# Patient Record
Sex: MALE | Race: WHITE | NOT HISPANIC OR LATINO | Employment: FULL TIME | URBAN - METROPOLITAN AREA
[De-identification: names, ages, dates, MRNs, and addresses within clinical notes are randomized per-mention and may not be internally consistent; named-entity substitution may affect disease eponyms.]

---

## 2024-02-28 NOTE — PROGRESS NOTES
It was a pleasure to see Mr. Liao at the Neurosurgery Spine Clinic at [facility].     He is a really nice 59 y.o. male  who presents to us with complaints NECK pain RADIATING into the LEFT arm  that started about  3  years  ago, and have been gradually worsening since that time.  The symptoms started after a fall    The pain is 10 /10. The pain is described as aching and sharp and occurs all day.  Symptoms are exacerbated by nothing in particular. Factors which relieve the pain include rest      Numbness and/or tingling - YES    Weakness : YES    Bladder/Bowel symptoms - NO    The patient has tried medications (eg: gabapentin, NSAIDS and narcotics ) : Yes  PT : No  Pain Management with ESIs/selective nerve blocks  - NO    he is a NON-SMOKER and NON-DIABETIC    History of Depression : NO    PRIOR SPINE SURGERY: NO    Denies use of Aspirin, Coumadin, or Plavix or any other anticoagulants     NARCOTICS for pain : NO    Part of this patient’s history is from personal review of the patient’s previous charts.      No past medical history on file.      No current outpatient medications on file.      Review of Systems :   Constitutional: No fever or chills  Respiratory: No shortness of breath or wheezing  Musculoskeletal: see HPI above   Neurologic: See HPI above        EXAM:   There were no vitals filed for this visit.  NEURO: Neurologically patient is awake alert and oriented X 3    No obvious cranial nerve deficit.  Strength is almost 5/5 throughout all motor groups tested with no asymmetric significant motor deficit except presence of weakness involving left upper extremity.  Deep tendon reflexes are symmetric throughout but he clearly has evidence of hyperreflexia  Gait : WNL   Sensory examination is intact to touch and painful stimuli.        IMAGING:   No MRI imaging will available as they were performed at an outside hospital.  Staff showed of an MRI that he had on his telephone showed evidence of  spinal cord compression.    ASSESSMENT AND PLAN:  Douglas Liao is a 59 y.o. male who presents to us with signs and symptoms of cervical myelopathy.  I reviewed 1 snapshot of his MRI that likely suggest that he would need surgical treatment.  I have ordered upright x-rays cervical spine and flexion-extension views at this point.  I will see him back in clinic in a week from now following completion of his imaging and obtaining the imaging from the other hospital to discuss surgical option at that point.    It was a pleasure to participate in Mr. Liao clinical care. All questions were answered to him satisfaction and he explained understanding of the further treatment plan.     Dale Harden MD, [facility]   of Neurological Surgery  [facility]  Attending Surgeon  Director - Minimally Invasive Spine Surgery  [place], [place]      ---Some of this note was completed using Dragon voice recognition technology and sometimes the software misinterprets words. This may include unintended errors with respect to translation of words, typographical errors or grammar errors which may not have been identified prior to finalization of the chart note. Please take this into account when reading this note---          Dale Harden MD, [facility]   of Neurological Surgery  [facility]  Attending Surgeon  Director - Minimally Invasive Spine Surgery  [place], [place]      Some of this note was completed using Dragon voice recognition technology and sometimes the software misinterprets words. This may include unintended errors with respect to translation of words, typographical errors or grammar errors which may not have been identified prior to finalization of the chart note. Please take this into account when reading this note

## 2024-02-29 ENCOUNTER — OFFICE VISIT (OUTPATIENT)
Dept: NEUROSURGERY | Facility: CLINIC | Age: 59
End: 2024-02-29
Payer: COMMERCIAL

## 2024-02-29 ENCOUNTER — HOSPITAL ENCOUNTER (OUTPATIENT)
Dept: RADIOLOGY | Facility: HOSPITAL | Age: 59
Discharge: HOME | End: 2024-02-29
Payer: COMMERCIAL

## 2024-02-29 VITALS
HEIGHT: 71 IN | RESPIRATION RATE: 20 BRPM | SYSTOLIC BLOOD PRESSURE: 154 MMHG | BODY MASS INDEX: 28 KG/M2 | WEIGHT: 200 LBS | HEART RATE: 86 BPM | DIASTOLIC BLOOD PRESSURE: 81 MMHG

## 2024-02-29 DIAGNOSIS — G95.9 CERVICAL MYELOPATHY (MULTI): Primary | ICD-10-CM

## 2024-02-29 PROCEDURE — 99203 OFFICE O/P NEW LOW 30 MIN: CPT | Performed by: NEUROLOGICAL SURGERY

## 2024-02-29 PROCEDURE — 72050 X-RAY EXAM NECK SPINE 4/5VWS: CPT | Performed by: STUDENT IN AN ORGANIZED HEALTH CARE EDUCATION/TRAINING PROGRAM

## 2024-02-29 PROCEDURE — 72050 X-RAY EXAM NECK SPINE 4/5VWS: CPT

## 2024-02-29 PROCEDURE — 99213 OFFICE O/P EST LOW 20 MIN: CPT | Performed by: NEUROLOGICAL SURGERY

## 2024-02-29 PROCEDURE — 1036F TOBACCO NON-USER: CPT | Performed by: NEUROLOGICAL SURGERY

## 2024-02-29 RX ORDER — BACLOFEN 10 MG/1
10 TABLET ORAL NIGHTLY PRN
COMMUNITY
Start: 2024-02-01

## 2024-02-29 ASSESSMENT — PAIN SCALES - GENERAL: PAINLEVEL: 6

## 2024-03-06 NOTE — PROGRESS NOTES
It was a pleasure to see Mr. Yanni fairchild at the Neurosurgery Spine Clinic at [facility]. He is a really nice 59 y.o. male who presents to us with complaints NECK pain RADIATING into the LEFT arm that started about 3 years ago, and have been gradually worsening since that time.  The symptoms started after a fall. The pain is 10 /10. The pain is described as aching and sharp and occurs all day.  Symptoms are exacerbated by nothing in particular. Factors which relieve the pain include rest. Hard to put arm over his head.  He has been having the symptoms for almost 2 years or so.  He mentioned that he developed a significant weakness involving the left upper extremity when it all started but then regained improvement in his strength.  At this point of time he continues to have significant numbness and tingling involving the left upper extremity especially the hands which is his predominant symptom.  He also has weakness involving the left upper extremity and has difficulty doing activities of daily living that involves fine dexterity.  Overall he denies any significant worsening in the right upper extremity or lower extremities and denies any significant gait imbalance.  He does does not have any bowel bladder symptoms either.  He does have significant neck pain especially involving the left paraspinal region that radiates into the upper extremities.   He also has stiffness involving the bilateral lower extremities.  At this point he is concerned mainly with the weakness and numbness and tingling especially in the left upper extremity.    Part of this patient’s history is from personal review of the patient’s previous charts.      Past Medical History:   Diagnosis Date    Neck pain            Current Outpatient Medications:     baclofen (Lioresal) 10 mg tablet, Take 1 tablet (10 mg) by mouth as needed at bedtime., Disp: , Rfl:       Review of Systems :   Constitutional: No fever or chills  Respiratory: No  shortness of breath or wheezing  Musculoskeletal: see HPI above   Neurologic: See HPI above        EXAM:   There were no vitals filed for this visit.    GENERAL: no apparent distress  EYES: No icterus;  extraocular movements intact   ENT: No mucosal ulcerations; normal hard and soft palate   NECK: trachea midline; no thyromegaly or lymphadenopathy   RESPIRATORY: normal respiratory effort; no audible wheezes/rhonchi  EXTREMITIES: normal pulses, no edema  SKIN: Normal temperature; no rash, ulcers or subcutaneous nodules     MUSCULOSKELETAL:   Range of motion of the cervical spine is significantly decreased especially in extension.     Paraspinal muscle spasm/tenderness absent.   Lhermitte Sign: Absent  NO scars in the neck.  NO obvious muscle atrophy ( Arm / Forearm /  thenar / hypothenar, or hand intrinsics)   NO evidence of any obvious sagittal and/or coronal plane malalignment    Romberg's test: Negative  Spurling's :positive  Gait: No abnormality    NEUROLOGIC: Patient is awake, alert and oriented to name, place and time.  No obvious cranial nerve deficit.    MOTOR 5/5 strength in right upper extremity and bilateral lower extremity but he has clear evidence of weakness involving left upper extremity involving multiple muscle groups.  Left shoulder abduction is about 4+ by 5 left biceps is about 4 x 5 left triceps about 4 - by 5 with handgrip about 40 to 50% and hand intrinsics strength being about 4 - by 5.       Bulk: Within normal limits   Tone: Increased bilateral lower extremities  Durham sign positive  Reflexes are symmetric throughout with very obvious hyperreflexia involving bilateral lower extremities..   Sensory exam shows no gross dermatomal sensory deficits to light touch and pain    IMAGING:   AP and lateral x-rays of the cervical spine along with flexion and extension views done on 2/29/2024 were reviewed personally and shows presence of degenerative changes involving the cervical spine that in the  form of disc collapse especially at C4-5 C5-6 and C6-7 level with presence of large anterior osteophytes at C4-5.  Patient has about 5 to 6 degrees of cervical kyphosis on upright lateral x-rays with no evidence of any obvious instability.  The Between flexion and extension range of motion is about 16 degrees.  MRI of the cervical spine dated 8/12/2023 and available to review on the PACS from the outside hospital shows presence of multilevel degenerative changes resulting in mild cervical stenosis at C3-4 and a severe cervical stenosis at C4-5 C5-6 and C6-7 along with disc degeneration and collapse resulting in severe spinal cord compression along with bilateral neuroforaminal compromise from C4-5 down to C6-7.  CT scan of the cervical spine done on 7/12/2023 was also reviewed and shows presence of C4-5 C5-6 C6-7 disc degeneration and collapse with significant vacuum phenomenon involving the C5 vertebral body.  There is presence of significant left C3-4 facet hypertrophy with almost autofusion of left C3-4 facet joint.    ASSESSMENT AND PLAN:  Mr. Liao has symptoms such as neck pain with upper extremity pain ,loss of manual dexterity, Numbness in hands and/or feet along with physical examination findings with presence of weakness involving left upper extremity lower limb spasticity, consistent with a diagnosis of cervical myelopathy and cervical radiculopathy  MRI shows significant spinal cord compression and severe stenosis.  The severity of cervical myelopathy is Moderate (12-14) based on mJOA scale     I discussed the natural history of degenerative cervical stenosis with the fact that the patient is at risk of gradual worsening of symptoms from persistent spinal cord compression and the potential for them to be irreversible. Considering the presence of signs and symptoms of cervical MYELOPATHY from SPINAL CORD COMPRESSION, medical necessity of surgical treatment to prevent further neurological decline was  also discussed.     I clearly emphasized that while the goal of surgery would be to decompress the spinal cord so as to ARREST the progression of neurological deficits. While preexisting deficits may or may not improve after surgery; up to 70-80% of patients does shows clinically significant improvement in functional and neurological outcomes following decompression of the spinal cord in patients with cervical degenerative myelopathy the extent of which could be variable and generally depends on the severity of myelopathy, duration of deficits and age of the patient.The option of continued non-operative management was also discussed as well though not strongly recommended because of presence of signs of cervical myelopathy and presence of spinal cord compression.     Pros and cons of operative versus nonoperative treatment were discussed extensively.  I extensively discussed with him the various surgical options that would consist of a focal C4-6 laminoplasty for decompression of the spinal cord versus posterior cervical decompression and fusion from C2-T1 versus anterior posterior surgery with multilevel decompression from the front followed by posterior stabilization versus an anterior alone surgery to address the most symptomatic stenotic levels from C4-C7.  He has seen Dr. Manolo Garner at [facility] who recommended a posterior cervical surgery along with a C2-T2 instrumented and fusion.  I did discuss with him the various goals that would be accomplished from either of those approaches and I went over with him the various complication profile associated with of each approaches.  Also discussed with him the fact that his C5 vertebral body has significant vacuum phenomena affecting almost 1/3 of the vertebral body and performing an ACDF at that level would be difficult to due to subsidence of the bone graft or cage that would be placed.  Hence an anterior approach would be would be a hybrid construct with a  corpectomy and an discectomy.    I recommended a anterior cervical approach with C5 corpectomy with placement of corpectomy cage and a C6-7 anterior cervical discectomy with placement of a titanium interbody cage and fusion using local bone autograft and DBM with a C4-7 anterior cervical plating.      I have explained the surgical procedure for an ANTERIOR CERVICAL DISCECTOMY AND FUSION (ACDF) with PLATING  in detail with expected duration and extent of recovery along risks of surgery that include, but is not limited to postoperative hematoma, neck pain, LOSS OF MOTION, dysphagia, recurrent laryngeal nerve palsy, voice changes/hoarseness of voice, pharyngeal or esophageal laceration, bleeding, infection, blood vessel injury or damage, nerve/spinal cord  injury or damage resulting in loss of sensation, loss of bladder, bowel or sexual function and weakness/complete paralysis, malunion, nonunion, CSF leak , brachial plexus injury, failure of implants/fusion, failure to relieve symptoms, recurrent disease, adjacent segment disease, need to reoperate for any reason and general anesthesia reaction such as stroke, coma, heart attack, delirium, confusion, death as well as worsening of preexisted medical conditions and any other unforeseen complication related to or unrelated to the spinal procedure per se. I have also discussed with the patient the chances of C5 palsy  ( about 2-3 % ) and the expected course and the chances of recovery if that happens. Of those 2-3 % of patietns majority of patients with C5 palsy would recover within 3-6 months but 5  -10 percent of all patiens who would develop C5 palsy may not recover and may have a permament C5 palsy with resultant limitations.  I did discuss with him that he does have some focal kyphosis at the C3-4 level but given the absence of any obvious stenosis at that point I would not recommend that involving at that level into the fusion but did caution him that he may  eventually need surgical treatment for that over time if that were to become symptomatic.  A Shared decision was made to proceed with surgery after  involving the patient in the treatment decision-making process. He clearly expressed understanding of possible the risks and benefits of surgery and the realistic expectations of surgery along with the fact that the goal of the surgery is to improve the overall functioning and quality of life and possible improvement or prevent progression of preexisting neurological deficits and not necessarily 100 % pain relief.     The patient was educated and advised against activities predisposing to falls/MVC/severe trauma  that may result in spinal cord injury/deficits/paralysis and instructed to watch for worsening of already existing symptoms of cervical myelopathy while awaiting surgery that we would schedule soon depending on patients desires and OR availability and should let us know or go to ED if that were to happen.   The patient has some personal issues that he need to sort out and wants to do the surgery in August or so.  I believe to the time he does not demonstrate any neurological decline it would be all right especially given the fact that he has been having symptoms for almost a couple of years now.  It was a pleasure to participate in Mr. Liao clinical care. All questions were answered to him satisfaction and he explained understanding of the further treatment plan.     Dale Harden MD, [facility]   of Neurological Surgery  [facility] [employer]  Attending Surgeon  Director - Minimally Invasive Spine Surgery  [place], [place]      ---Some of this note was completed using Dragon voice recognition technology and sometimes the software misinterprets words. This may include unintended errors with respect to translation of words, typographical errors or grammar errors which may not  have been identified prior to finalization of the chart note. Please take this into account when reading this note---

## 2024-03-07 ENCOUNTER — OFFICE VISIT (OUTPATIENT)
Dept: NEUROSURGERY | Facility: CLINIC | Age: 59
End: 2024-03-07
Payer: COMMERCIAL

## 2024-03-07 VITALS
DIASTOLIC BLOOD PRESSURE: 90 MMHG | HEART RATE: 82 BPM | BODY MASS INDEX: 28 KG/M2 | WEIGHT: 200 LBS | HEIGHT: 71 IN | SYSTOLIC BLOOD PRESSURE: 173 MMHG | RESPIRATION RATE: 20 BRPM

## 2024-03-07 DIAGNOSIS — M47.10 SPINAL CORD COMPRESSION DUE TO DEGENERATIVE DISORDER OF SPINAL COLUMN: ICD-10-CM

## 2024-03-07 DIAGNOSIS — M40.12 OTHER SECONDARY KYPHOSIS, CERVICAL REGION: ICD-10-CM

## 2024-03-07 DIAGNOSIS — G95.9 CERVICAL MYELOPATHY (MULTI): Primary | ICD-10-CM

## 2024-03-07 DIAGNOSIS — M54.12 CERVICAL RADICULOPATHY AT C6: ICD-10-CM

## 2024-03-07 PROCEDURE — 99215 OFFICE O/P EST HI 40 MIN: CPT | Performed by: NEUROLOGICAL SURGERY

## 2024-03-07 PROCEDURE — 1036F TOBACCO NON-USER: CPT | Performed by: NEUROLOGICAL SURGERY

## 2024-03-07 ASSESSMENT — PAIN SCALES - GENERAL: PAINLEVEL: 4

## 2025-02-03 ENCOUNTER — TELEPHONE (OUTPATIENT)
Dept: NEUROSURGERY | Facility: HOSPITAL | Age: 60
End: 2025-02-03
Payer: COMMERCIAL

## 2025-02-09 NOTE — PROGRESS NOTES
It was a pleasure to see Mr. Liao back at the Neurosurgery Sp[facility] at [facility]. He is a really nice 59 y.o. male who I saw back in 2024 March for his signs and symptoms of cervical myelopathy.  At that point we discussed his clinical symptoms imaging findings and I recommended surgical intervention.  The patient wanted to wait before proceeding with any surgical intervention.  He is here today again to discuss his ongoing symptoms and further treatment options at this point.  He feels majority of his symptoms continues to remain the same with some worsening over the past few months or so.    He continues to have complaints of NECK pain RADIATING into the LEFT arm that started about 4 years ago, and have been gradually worsening since that time.  The symptoms started after a fall. The pain is 10 /10. The pain is described as aching and sharp and occurs all day.  Symptoms are exacerbated by nothing in particular. Factors which relieve the pain include rest. Hard to put arm over his head.  He also has weakness involving the upper extremity and has difficulty doing activities of daily living that involves fine dexterity.  He does does not have any bowel bladder symptoms either.  He does have significant neck pain especially involving the left paraspinal region that radiates into the upper extremities.   He also has stiffness involving the bilateral lower extremities.  At this point he is concerned mainly with the weakness and numbness and tingling especially in the left upper extremity.  He feels significant numbness involving the left pinky and ring finger   Part of this patient’s history is from personal review of the patient’s previous charts.        Part of this patient’s history is from personal review of the patient’s previous charts.      Past Medical History:   Diagnosis Date    Neck pain            Current Outpatient Medications:     baclofen (Lioresal) 10 mg tablet, Take 1 tablet (10 mg)  by mouth as needed at bedtime., Disp: , Rfl:          Review of Systems :   Constitutional: No fever or chills  Respiratory: No shortness of breath or wheezing  Musculoskeletal: see HPI above   Neurologic: See HPI above           EXAM:   There were no vitals filed for this visit.     GENERAL: no apparent distress  EYES: No icterus;  extraocular movements intact   ENT: No mucosal ulcerations; normal hard and soft palate       NEUROLOGIC: Patient is awake, alert and oriented to name, place and time.  No obvious cranial nerve deficit.    MOTOR 5/5 strength in right upper extremity and bilateral lower extremity but he has clear evidence of weakness involving left upper extremity involving multiple muscle groups.  Left shoulder abduction is about 4+ by 5 left biceps is about 4 x 5 left triceps about 4 - by 5 with handgrip about 40 to 50% and hand intrinsics strength being about 4 - by 5.                                      Bulk: Within normal limits   Tone: Increased bilateral lower extremities  Durham sign positive  Reflexes are symmetric throughout with very obvious hyperreflexia involving bilateral lower extremities..   Sensory exam shows presence of a significant numbness involving the left pinky and ring finger as compared to the right     IMAGING:   AP and lateral x-rays of the cervical spine along with flexion and extension views done on 2/29/2024 were reviewed personally and shows presence of degenerative changes involving the cervical spine that in the form of disc collapse especially at C4-5 C5-6 and C6-7 level with presence of large anterior osteophytes at C4-5.    There is also presence of C3-4 grade 1 spondylolisthesis especially on flexion patient has about 5 to 6 degrees of cervical kyphosis on upright lateral x-rays with no evidence of any obvious instability.     MRI of the cervical spine dated 8/12/2023 and available to review on the PACS from the outside hospital shows presence of multilevel  degenerative changes resulting in mild cervical stenosis at C3-4 and a severe cervical stenosis at C4-5 C5-6 and C6-7 along with disc degeneration and collapse resulting in severe spinal cord compression along with bilateral neuroforaminal compromise from C4-5 down to C6-7.  CT scan of the cervical spine done on 7/12/2023 was also reviewed and shows presence of C4-5 C5-6 C6-7 disc degeneration and collapse with significant vacuum phenomenon involving the C5 vertebral body.  There is presence of significant left C3-4 facet hypertrophy with almost autofusion of left C3-4 facet joint.           ASSESSMENT AND PLAN:  Mr. Liao has symptoms such as neck pain with upper extremity pain ,loss of manual dexterity, Numbness in hands and/or feet along with physical examination findings with presence of weakness involving left upper extremity lower limb spasticity, consistent with a diagnosis of cervical myelopathy and cervical radiculopathy.  He has noted mild worsening of his symptoms ever since he saw me last time.  We discussed options of surgery last time but seems like he had some personal issues and wanted to withhold surgery for which she seems to be ready now and is here today to discuss and schedule surgery if possible.  MRI shows significant spinal cord compression and severe stenosis.  The severity of cervical myelopathy is Moderate (12-14) based on mJOA scale     I discussed the natural history of degenerative cervical stenosis with the fact that the patient is at risk of gradual worsening of symptoms from persistent spinal cord compression and the potential for them to be irreversible. Considering the presence of signs and symptoms of cervical MYELOPATHY from SPINAL CORD COMPRESSION, medical necessity of surgical treatment to prevent further neurological decline was also discussed.      I clearly emphasized that while the goal of surgery would be to decompress the spinal cord so as to ARREST the progression of  neurological deficits. While preexisting deficits may or may not improve after surgery; up to 70-80% of patients does shows clinically significant improvement in functional and neurological outcomes following decompression of the spinal cord in patients with cervical degenerative myelopathy the extent of which could be variable and generally depends on the severity of myelopathy, duration of deficits and age of the patient.The option of continued non-operative management was also discussed as well though not strongly recommended because of presence of signs of cervical myelopathy and presence of spinal cord compression.      Pros and cons of operative versus nonoperative treatment were discussed extensively.  I extensively discussed with him the various surgical options that would consist of a focal C4-6 laminoplasty for decompression of the spinal cord versus posterior cervical decompression and fusion from C2-T1 versus anterior posterior surgery with multilevel decompression from the front followed by posterior stabilization versus an anterior alone surgery to address the most symptomatic stenotic levels from C4-C7.  He has seen Dr. Manolo Garner at [facility] who recommended a posterior cervical surgery along with a C2-T2 instrumented and fusion.  I did discuss with him the various goals that would be accomplished from either of those approaches and I went over with him the various complication profile associated with of each approaches.  Also discussed with him the fact that his C5 vertebral body has significant vacuum phenomena affecting almost 1/3 of the vertebral body and performing an ACDF at that level would be difficult to due to subsidence of the bone graft or cage that would be placed.  Hence any anterior approach would be would be a hybrid construct with a corpectomy and an discectomy.  Given the fact that he has stenosis at 4 levels he would remain at significantly high risk of pseudoarthrosis and  failure if not stabilize concomitantly posteriorly and hence at this point of time I would recommend an anterior posterior same-day approach.     I recommended a Same-day anterior posterior approach with C5 corpectomy with placement of corpectomy cage, C3-4 and C6-7 anterior cervical discectomy with placement of a stand-alone titanium interbody cage with integrated fixation, followed by C4-5 laminoforaminotomy and the C2-T1 instrumentation and fusion using  local bone autograft and signify which is an osteopromotive  material for spine surgery      The patient MRI of the cervical spine at this point of time he is almost 2 years old and hence I have ordered a new MRI to make sure that there are no new changes that would require any kind of modification of the surgical plan.     I have explained the surgical procedure in detail with expected duration and extent of recovery along risks of surgery that include, but is not limited to neck pain, postoperative hematoma, dysphagia with possible need of feeding tube or PEG placement, prolonged intubation with possible need of tracheostomy, recurrent laryngeal nerve palsy, voice changes/hoarseness of voice, pharyngeal or esophageal laceration, bleeding, infection, blood vessel injury or damage, nerve/spinal cord  injury or damage resulting in loss of sensation, loss of bladder, bowel or sexual function and weakness/complete paralysis, malunion, nonunion, CSF leak , blindness,  brachial plexus injury, failure of implants/fusion, failure to relieve symptoms, recurrent disease, adjacent segment disease, need to reoperate for any reason and general anesthesia reaction such as stroke, coma, heart attack, delirium, confusion, death as well as worsening of preexisted medical conditions and any other unforeseen complication related to unrelated to the spinal procedure per se. I have also discussed with the patient the chances of C5 palsy  ( about 5- 8 % ) and the expected course and  the chances of recovery if that happens. Of those 5-8 % of patietns majority of patients with C5 palsy would recover within 3-6 months but 5  -10 percent of all patiens who would develop C5 palsy may not recover and may have a permament C5 palsy with resultant limitations.    A Shared decision was made to proceed with surgery after  involving the patient in the treatment decision-making process. He clearly expressed understanding of possible the risks and benefits of surgery and the realistic expectations of surgery along with the fact that the goal of the surgery is to improve the overall functioning and quality of life and possible improvement or prevent progression of preexisting neurological deficits and not necessarily 100 % pain relief.      The patient was educated and advised against activities predisposing to falls/MVC/severe trauma  that may result in spinal cord injury/deficits/paralysis and instructed to watch for worsening of already existing symptoms of cervical myelopathy while awaiting surgery that we would schedule soon depending on patients desires and OR availability and should let us know or go to ED if that were to happen.     The patient has some personal issues that he need to sort out and wants to do the surgery in July or August this year or so.  I believe to the time he does not demonstrate any neurological decline it would be all right especially given the fact that he has been having symptoms for almost a couple of years now.  It was a pleasure to participate in Mr. Liao clinical care. All questions were answered to him satisfaction and he explained understanding of the further treatment plan.      Dale Harden MD, [facility]   of Neurological Surgery  [facility] [employer]  Attending Surgeon  Director - Minimally Invasive Spine Surgery  [place], [place]        Some of this note was completed using Dragon  voice recognition technology and sometimes the software misinterprets words. This may include unintended errors with respect to translation of words, typographical errors or grammar errors which may not have been identified prior to finalization of the chart note. Please take this into account when reading this note

## 2025-02-20 ENCOUNTER — OFFICE VISIT (OUTPATIENT)
Dept: NEUROSURGERY | Facility: CLINIC | Age: 60
End: 2025-02-20
Payer: COMMERCIAL

## 2025-02-20 VITALS
WEIGHT: 205 LBS | HEIGHT: 71 IN | HEART RATE: 76 BPM | RESPIRATION RATE: 18 BRPM | TEMPERATURE: 97.9 F | DIASTOLIC BLOOD PRESSURE: 84 MMHG | BODY MASS INDEX: 28.7 KG/M2 | SYSTOLIC BLOOD PRESSURE: 156 MMHG

## 2025-02-20 DIAGNOSIS — M47.10 SPINAL CORD COMPRESSION DUE TO DEGENERATIVE DISORDER OF SPINAL COLUMN: ICD-10-CM

## 2025-02-20 DIAGNOSIS — M40.202 KYPHOSIS OF CERVICAL REGION, UNSPECIFIED KYPHOSIS TYPE: Primary | ICD-10-CM

## 2025-02-20 DIAGNOSIS — M54.2 NECK PAIN: ICD-10-CM

## 2025-02-20 DIAGNOSIS — G95.9 CERVICAL MYELOPATHY: ICD-10-CM

## 2025-02-20 PROCEDURE — 3008F BODY MASS INDEX DOCD: CPT | Performed by: NEUROLOGICAL SURGERY

## 2025-02-20 PROCEDURE — 99215 OFFICE O/P EST HI 40 MIN: CPT | Performed by: NEUROLOGICAL SURGERY

## 2025-02-20 PROCEDURE — 1036F TOBACCO NON-USER: CPT | Performed by: NEUROLOGICAL SURGERY

## 2025-02-20 ASSESSMENT — ENCOUNTER SYMPTOMS
DEPRESSION: 1
OCCASIONAL FEELINGS OF UNSTEADINESS: 1
LOSS OF SENSATION IN FEET: 0

## 2025-02-20 ASSESSMENT — PAIN SCALES - GENERAL: PAINLEVEL_OUTOF10: 2

## 2025-02-24 PROBLEM — G95.9 CERVICAL MYELOPATHY: Status: ACTIVE | Noted: 2025-02-20

## 2025-02-24 PROBLEM — M47.10 SPINAL CORD COMPRESSION DUE TO DEGENERATIVE DISORDER OF SPINAL COLUMN: Status: ACTIVE | Noted: 2025-02-20

## 2025-02-24 PROBLEM — M40.202 KYPHOSIS OF CERVICAL REGION: Status: ACTIVE | Noted: 2025-02-20

## 2025-05-23 ENCOUNTER — ANCILLARY PROCEDURE (OUTPATIENT)
Dept: URGENT CARE | Age: 60
End: 2025-05-23
Payer: COMMERCIAL

## 2025-05-23 ENCOUNTER — OFFICE VISIT (OUTPATIENT)
Dept: URGENT CARE | Age: 60
End: 2025-05-23
Payer: COMMERCIAL

## 2025-05-23 VITALS
RESPIRATION RATE: 16 BRPM | TEMPERATURE: 98.1 F | SYSTOLIC BLOOD PRESSURE: 152 MMHG | OXYGEN SATURATION: 98 % | DIASTOLIC BLOOD PRESSURE: 89 MMHG | HEART RATE: 81 BPM

## 2025-05-23 DIAGNOSIS — R05.1 ACUTE COUGH: ICD-10-CM

## 2025-05-23 DIAGNOSIS — J98.8 RESPIRATORY TRACT INFECTION: Primary | ICD-10-CM

## 2025-05-23 PROCEDURE — 1036F TOBACCO NON-USER: CPT | Performed by: PHYSICIAN ASSISTANT

## 2025-05-23 PROCEDURE — 71046 X-RAY EXAM CHEST 2 VIEWS: CPT | Performed by: PHYSICIAN ASSISTANT

## 2025-05-23 PROCEDURE — 99203 OFFICE O/P NEW LOW 30 MIN: CPT | Performed by: PHYSICIAN ASSISTANT

## 2025-05-23 RX ORDER — AZITHROMYCIN 250 MG/1
TABLET, FILM COATED ORAL
Qty: 6 TABLET | Refills: 0 | Status: SHIPPED | OUTPATIENT
Start: 2025-05-23 | End: 2025-05-28

## 2025-05-23 NOTE — PROGRESS NOTES
Subjective   Patient ID: Douglas Liao is a 60 y.o. male. They present today with a chief complaint of Cough (R/O pneumonia).    CC: URI symptoms    HPI: Patient presenting for URI symptoms x 6 days.  Symptoms include runny nose, congestion, and cough.  No fever, chills, myalgias, abdominal pain, nausea, vomiting, diarrhea, rash.  No chest pain or shortness of breath.  No history of clots or clotting disorders.  No lower extremity swelling edema or pain.  No recent travel or surgeries.    Past Medical History  Allergies as of 05/23/2025    (No Known Allergies)       Prescriptions Prior to Admission[1]    Medical History[2]    Surgical History[3]     reports that he has quit smoking. His smoking use included cigarettes. He has never used smokeless tobacco. He reports current alcohol use. He reports that he does not use drugs.    Review of Systems  Review of Systems    After reviewing all body systems I have documented pertinent findings above in the history.  All other Systems reviewed and are negative for complaint.  Pertinent positive and negatives are listed in the above HPI.    Objective    Vitals:    05/23/25 1025   BP: 152/89   Pulse: 81   Resp: 16   Temp: 36.7 °C (98.1 °F)   SpO2: 98%     No LMP for male patient.  Physical Exam    General: Alert, oriented, and cooperative.  No acute distress. Well developed, well nourished.     Skin: Skin is warm, and dry. No rashes or lesions.    Eyes: Sclera and conjunctivae normal     Ears: TM´s are intact, pink/grey, with mild effusions bilaterally.  No significant erythema.  No hemotympanum or rupture. Bilateral auricle, helix, and tragus without inflammation or erythema.  No mastoid erythema, or tenderness.  No deformities. Right and left canal negative for erythema, or discharge.  Hearing is grossly intact bilaterally    Mouth/Throat: Pharynx is erythematous.  Tonsils are equal in size.  No exudates.  No angioedema of the lips or tongue.  No respiratory compromise,  tripoding, drooling, muffled voice,  stridor, or trismus.     Neck: Supple.     Cardiac: Regular rate and rhythm    Respiratory:  No acute respiratory distress.  Regular rate of breathing.  No accessory muscle use.  No tripoding.  Lungs are clear bilaterally.    Abdomen: Soft, nontender.    Musculoskeletal: Upper and lower extremities are atraumatic in appearance without deformity, erythema, edema, and atrophy. No crepitus, or tenderness. Compartments are all soft.    Procedures    Point of Care Test & Imaging Results from this visit  No results found for this or any previous visit.  Imaging  XR chest 2 views  Result Date: 5/23/2025  No acute cardiopulmonary process is evident.   MACRO: None   Signed by: Bucky Raphael 5/23/2025 10:54 AM Dictation workstation:   [id number]      Cardiology, Vascular, and Other Imaging  No other imaging results found for the past 2 days      Diagnostic study results (if any) were reviewed by Efrain Baires PA-C.    Assessment/Plan   Allergies, medications, history, and pertinent labs/EKGs/Imaging reviewed by Efrain Baires PA-C.     MDM:  Patient presenting for URI type symptoms x 6 days.  No relief despite taking over-the-counter medications.  Patient overall looks well.  Speaks in complete sentences.  No evidence of acute distress or respiratory compromise.  No tripoding. No nasal flaring. No clinical signs or history to suggest meningitis, Elvin´s, peritonsillar abscess, epiglottitis, or asthma.  Due length and worsening of  symptoms a bacterial respiratory tract infection is considered the most likely cause.  Through shared decision making the patient was prescribed an antimicrobial as a treatment measure.  Advised supportive therapy with over the counter medication and follow-up with a PCP in the next few days. Pt/family instructed to return to the urgent care or emergency department if symptoms worsen or if new symptoms develop. Patient/family expressed understanding  and consented to the above plan. No barriers of communication were apparent.    Declined in-house testing of the chest x-ray.      Orders and Diagnoses  Diagnoses and all orders for this visit:  Respiratory tract infection  -     azithromycin (Zithromax) 250 mg tablet; Take 2 tabs (500 mg) by mouth today, than 1 daily for 4 days.  Acute cough  -     XR chest 2 views; Future  -     azithromycin (Zithromax) 250 mg tablet; Take 2 tabs (500 mg) by mouth today, than 1 daily for 4 days.      Medical Admin Record    Patient disposition: Home    Electronically signed by Efrain Baires PA-C  10:58 AM         [1] (Not in a hospital admission)   [2]   Past Medical History:  Diagnosis Date    Neck pain    [3]   Past Surgical History:  Procedure Laterality Date    NO PAST SURGERIES

## 2025-05-23 NOTE — PATIENT INSTRUCTIONS
You were seen for cold like symptoms.  You most likely have a upper respiratory tract infection.  I recommend supportive therapy with the following medications below.    PLAN:  1.  Please take antibiotic as prescribed    2.  You can use Tea and honey for cough. This is known to work better than most OTC medications.    3.  Mucinex can break up congestion in adults.        Aller-Pia or over-the-counter children's cold medication such as Dimetapp can be beneficial for symptoms in kids.    4.  Stay well-hydrated and drink lots of fluids.    5.  Follow up with your primary care physician in the next few days for reevaluation, especially if symptoms are not improving.    6.  Return to the urgent care or emergency department if symptoms worsen or new symptoms develop.    Based on clinical exam findings and history you most likely have a upper respiratory tract infection.  Your initial illness was likely caused by a virus that progressed to a secondary bacterial infection.  You are contagious as soon as the symptoms start.  Your symptoms may persist up to 2-3 weeks.  Symptoms usually peak by days 3 or 5.  Typical symptoms include nasal congestion, a runny nose, scratchy throat, cough, and irritability. The diagnosis is based on symptoms. Good hand hygiene is the best way to prevent these infections, and routine vaccination can help prevent influenza. Treatment aims to relieve symptoms. Rest and fluids. Tylenol and/or ibuprofen for fever and pain. Over the counter decongestants or mucolytics.  All do not take medications that may be contraindicated by another medical condition.

## 2025-08-27 ENCOUNTER — APPOINTMENT (OUTPATIENT)
Dept: PRIMARY CARE | Facility: CLINIC | Age: 60
End: 2025-08-27
Payer: COMMERCIAL

## 2025-08-27 PROBLEM — M50.20 HNP (HERNIATED NUCLEUS PULPOSUS), CERVICAL: Status: ACTIVE | Noted: 2021-03-04

## 2025-09-16 ENCOUNTER — APPOINTMENT (OUTPATIENT)
Dept: PRIMARY CARE | Facility: CLINIC | Age: 60
End: 2025-09-16
Payer: COMMERCIAL

## 2025-10-01 ENCOUNTER — APPOINTMENT (OUTPATIENT)
Dept: NEUROSURGERY | Facility: HOSPITAL | Age: 60
End: 2025-10-01
Payer: COMMERCIAL

## 2025-10-30 ENCOUNTER — APPOINTMENT (OUTPATIENT)
Dept: NEUROSURGERY | Facility: CLINIC | Age: 60
End: 2025-10-30
Payer: COMMERCIAL